# Patient Record
Sex: FEMALE | Race: AMERICAN INDIAN OR ALASKA NATIVE
[De-identification: names, ages, dates, MRNs, and addresses within clinical notes are randomized per-mention and may not be internally consistent; named-entity substitution may affect disease eponyms.]

---

## 2018-04-17 ENCOUNTER — HOSPITAL ENCOUNTER (OUTPATIENT)
Dept: HOSPITAL 5 - SPVIMAG | Age: 63
Discharge: HOME | End: 2018-04-17
Attending: SURGERY
Payer: MEDICARE

## 2018-04-17 DIAGNOSIS — N60.01: Primary | ICD-10-CM

## 2018-04-17 PROCEDURE — C8908 MRI W/O FOL W/CONT, BREAST,: HCPCS

## 2018-04-17 PROCEDURE — 77059: CPT

## 2018-04-17 PROCEDURE — A9577 INJ MULTIHANCE: HCPCS

## 2018-04-19 NOTE — MAGNETIC RESONANCE REPORT
BILATERAL BREAST MRI WITHOUT AND WITH CONTRAST: 04/17/18 13:41:00



CLINICAL: Left breast pain and nipple discharge.



COMPARISON:04/05/18 [facility] left mammogram and 11/13/17 bilateral 

mammogram from [facility].



TECHNIQUE: Axial 1.0-mm T1 without,  axial high resolution 2.0-mm T2 

and axial 1.0-mm dynamic Vibrant high-resolution postcontrast T1 fat 

saturation sequences on a 1.5 Allison magnet.  The examination was 

performed with an 8 channel dedicated Sentinelle breast coil. Post 

processing with CAD and subtraction was performed on an [employer] 

workstation.  19.0 cc of Multihance was injected without incident via a 

left antecubital vein 22-gauge INT for the contrast portion of the 

exam. Consent was obtained prior to the administration of the contrast. 

 



FINDINGS:



Right: Mild background parenchymal enhancement.  No mass or suspicious 

enhancement.  No suspicious right axillary or right internal mammary 

lymph nodes.



Left: Mild background parenchymal enhancement.  Lesion 1 is segmental 

non-Mass enhancement at 12 o'clock 5.8 cm from the nipple measures 2.7 

x 1.4 x 0.9 cm.  It demonstrates heterogeneous enhancement with mixed 

kinetics, 70% peak enhancement and 11% type III washout.  Lesion 2 is 

focal non-Mass enhancement at 12:30 o'clock 2.2 cm from the nipple 

measuring 7.9 x 5.1 x 3.6 mm.  It demonstrates heterogeneous 

enhancement with mixed kinetics, 80% peak enhancement and 32% type III 

washout.  Lesion 3 is focal non-Mass enhancement at 2:30 o'clock 8 cm 

from the nipple measuring 12.0 x 7.7 x 5.8 mm.  It demonstrates 

heterogeneous enhancement with mixed kinetics, 135% peak enhancement 

and 5% type III washout.  No mass and no other suspicious enhancement.  

Several benign cysts of the right breast.  The largest measures 1.2 cm. 

 No suspicious left axillary or left internal mammary lymph nodes.





IMPRESSION: 1.  Suspicious non-Mass enhancement of the left breast at 

three sites.  Recommend MRI guided needle biopsy of at least 2 sites.  

2.  Negative right breast.  3.  No suspicious lymph nodes.



RIGHT BI-RADS  1 -- Negative





LEFT BI-RADS 4 -- Suspicious

## 2018-04-30 ENCOUNTER — HOSPITAL ENCOUNTER (OUTPATIENT)
Dept: HOSPITAL 5 - SPVIMAG | Age: 63
Discharge: HOME | End: 2018-04-30
Attending: SURGERY
Payer: MEDICARE

## 2018-04-30 DIAGNOSIS — D24.2: Primary | ICD-10-CM

## 2018-04-30 PROCEDURE — 77065 DX MAMMO INCL CAD UNI: CPT

## 2018-04-30 PROCEDURE — 19085 BX BREAST 1ST LESION MR IMAG: CPT

## 2018-04-30 PROCEDURE — 19086 BX BREAST ADD LESION MR IMAG: CPT

## 2018-04-30 PROCEDURE — A9577 INJ MULTIHANCE: HCPCS

## 2018-04-30 PROCEDURE — 88305 TISSUE EXAM BY PATHOLOGIST: CPT

## 2018-04-30 NOTE — MAGNETIC RESONANCE REPORT
MRI GUIDED VACUUM ASSISTED CORE BIOPSY TWO SITES LEFT BREAST: 04/30/18



CLINICAL: Left nipple discharge and suspicious non-Mass enhancement on 

recent MRI.



COMPARISON: 04/17/18 MRI



FINDINGS: Consent for the procedure was obtained.  A Vibrant dynamic 

postcontrast series was performed on a 1.5 Allison magnet using an 8 

channel Sentinelle dedicated breast coil.  Two lesions were localized 

and targeted using [employer] Sentinelle biopsy software. 



The skin was anesthetized with 1% lidocaine and small dermatotomies 

were performed.  2% lidocaine was administered for deeper anesthesia.  

9-G biopsy was performed with an IntegenX vacuum assisted device.  

Imaging demonstrated satisfactory positioning of the probe at the two 

sites and satisfactory samples were obtained.  Clips were deployed at 

both sites after confirmation of adequate sampling. The probes were 

removed and hemostasis was achieved with pressure to the sites. Sterile 

dressings were applied.

 

The patient tolerated the procedure well and there were no apparent 

complications. A two view mammogram demonstrated successful deployment 

of both clips.



The patient left the department in good condition and was given 

instructions for wound care and follow-up. 



IMPRESSION: Uncomplicated MRI biopsy with clip placement two sites left 

breast.

## 2018-04-30 NOTE — MAMMOGRAPHY REPORT
LEFT DIGITAL DIAGNOSTIC MAMMOGRAM: 04/30/18 08:13:00



CLINICAL: For clip placement immediately status post MRI guided breast 

biopsy at 2 sites .



COMPARISON:04/05/18



FINDINGS: Biopsy clips are identified at the 2 sites that were biopsied 

today with MRI guidance.The clips are 6 cm apart on the CC view. 





IMPRESSION: Concordant clip placement status post MRI biopsy at 2 sites.



BI-RADS CATEGORY:  4--Suspicious



Pathology pending.

## 2018-05-17 ENCOUNTER — HOSPITAL ENCOUNTER (OUTPATIENT)
Dept: HOSPITAL 5 - OR | Age: 63
Discharge: HOME | End: 2018-05-17
Attending: SURGERY
Payer: MEDICARE

## 2018-05-17 VITALS — DIASTOLIC BLOOD PRESSURE: 59 MMHG | SYSTOLIC BLOOD PRESSURE: 111 MMHG

## 2018-05-17 DIAGNOSIS — I10: ICD-10-CM

## 2018-05-17 DIAGNOSIS — N60.82: ICD-10-CM

## 2018-05-17 DIAGNOSIS — E11.9: ICD-10-CM

## 2018-05-17 DIAGNOSIS — D24.2: Primary | ICD-10-CM

## 2018-05-17 DIAGNOSIS — Z91.013: ICD-10-CM

## 2018-05-17 DIAGNOSIS — F41.9: ICD-10-CM

## 2018-05-17 DIAGNOSIS — Z88.5: ICD-10-CM

## 2018-05-17 DIAGNOSIS — C50.412: ICD-10-CM

## 2018-05-17 DIAGNOSIS — Z80.3: ICD-10-CM

## 2018-05-17 DIAGNOSIS — Z98.890: ICD-10-CM

## 2018-05-17 DIAGNOSIS — Z91.041: ICD-10-CM

## 2018-05-17 DIAGNOSIS — F32.9: ICD-10-CM

## 2018-05-17 PROCEDURE — 82962 GLUCOSE BLOOD TEST: CPT

## 2018-05-17 PROCEDURE — 76098 X-RAY EXAM SURGICAL SPECIMEN: CPT

## 2018-05-17 PROCEDURE — 88307 TISSUE EXAM BY PATHOLOGIST: CPT

## 2018-05-17 PROCEDURE — 19125 EXCISION BREAST LESION: CPT

## 2018-05-17 PROCEDURE — 19282 PERQ DEVICE BREAST EA IMAG: CPT

## 2018-05-17 PROCEDURE — 36415 COLL VENOUS BLD VENIPUNCTURE: CPT

## 2018-05-17 PROCEDURE — 19126 EXCISION ADDL BREAST LESION: CPT

## 2018-05-17 PROCEDURE — 84132 ASSAY OF SERUM POTASSIUM: CPT

## 2018-05-17 PROCEDURE — 19281 PERQ DEVICE BREAST 1ST IMAG: CPT

## 2018-05-17 NOTE — MAMMOGRAPHY REPORT
SPECIMEN RADIOGRAPH LEFT BREAST: 05/17/18 06:49:00





CLINICAL: Surgical excision of known cancer.



FINDINGS: The targeted lesion with a localizer clip and a hookwire are 

identified within the specimen.  The clip corresponds to the more 

posterior cancer in the outer breast.



IMPRESSION: Excision of the targeted lesion.

## 2018-05-17 NOTE — ANESTHESIA CONSULTATION
Anesthesia Consult and Med Hx


Date of service: 05/17/18





- Airway


Anesthetic Teeth Evaluation: Poor


ROM Head & Neck: Adequate


Mental/Hyoid Distance: Adequate


Mallampati Class: Class III


Intubation Access Assessment: Probably Good





- Pulmonary Exam


CTA: Yes





- Cardiac Exam


Cardiac Exam: RRR





- Pre-Operative Health Status


ASA Pre-Surgery Classification: ASA3


Proposed Anesthetic Plan: General, MAC





- Cardiovascular System


Hx Hypertension: Yes (10 YEARS)





- Central Nervous System


Hx Psychiatric Problems: Yes (anxiety/depression)





- Endocrine


Hx Non-Insulin Dependent Diabetes: Yes





- Other Systems


Hx Alcohol Use: No


Hx Substance Use: No


Hx Cancer: No


Hx Obesity: Yes

## 2018-05-17 NOTE — OPERATIVE REPORT
Operative Report


Operative Report: 





Date of Service: May 17, 2018





Preoperative diagnosis: Left central breast intraductal papilloma and left 

upper outer quadrant intraductal papilloma





Postoperative diagnosis: Same





Procedure: Left needle localization excisional biopsy of two intraductal 

papillomas





Surgeon: Isi Acevedo MD





Anesthesia: General





Findings: Left wire and clip present within radiograph specimens of both lesions





Complications: None





EBL: Minimal





Disposition: PACU in good condition





Procedure in detail: This is a 62-year-old lady with recent left  breast MRI 

guided biopsy with findings of two intraductal papillomas. Recommendations were 

to proceed with an excisional biopsy of both areas to rule out malignancy. 

Patient wished to proceed with the above procedure. 





Procedure in detail: The patient was taken to radiology for wire placement for 

localization of area of concern of both areas. Patient was then taken to the 

operating room. Gen. anesthesia was administered. Left breast was prepped and 

draped in the normal sterile operative fashion. Both wires were identified. 

Timeout was performed. An upper outer quadrant breast incision was made with a 

15 blade knife and dissection taken down to subcutaneous tissues. First began 

with central breast excisional biopsy of papilloma with raising of the medial 

flap with removal of the wire from the skin, followed by raising of the lateral 

flap, superior flap and inferior flap. The breast area of concern was 

appropriately removed posteriorly with the aid of the bovie cautery. The wire 

was not encountered. Specimen was marked and then sent to pathology and 

radiology; radiograph specimen with wire and clip present. Breast cavity was 

irrigated and hemostasis was obtained. 





Attention was then taken towards the left upper outer quadrant papilloma.  

Through the same incision, first began raising of the lateral flap with removal 

of the wire from the skin, followed by raising of the medial flap, superior 

flap and inferior flap. The breast area of concern was appropriately removed 

posteriorly  with the aid of the bovie cautery. The wire was not encountered. 

Specimen was marked and then sent to pathology and radiology; radiograph 

specimen with wire and clip present. Breast cavity was irrigated and hemostasis 

was obtained. The breast cavity was anesthetized with 1% lidocaine mixed with 

quarter percent Marcaine. The subcutaneous tissues were approximated and closed 

using interrupted 3-0 Vicryl followed by a running 4-0 Monocryl and skin affix. 

She tolerated surgery very well and was awaken from anesthesia without any 

complications and transported to PACU in good condition.

## 2018-05-17 NOTE — MAMMOGRAPHY REPORT
NEEDLE LOCALIZATION AND HOOKWIRE PLACEMENT x2 LEFT BREAST:05/17/18 



CLINICAL: Known left breast cancer at 2 sites.



COMPARISON: 04/30/18



FINDINGS: Using mammographic guidance, 1% lidocaine local anesthesia 

and sterile technique, a 7.5-cm Downey hookwire was placed from a 

lateral approach to localize the more anterior lateral clip which 

corresponds to an MRI biopsy site.  A 5.0-cm  Downey hookwire was then 

placed from a lateral approach to localize the more posterior lateral 

clip which corresponds to the initial biopsy site.  Hook wires were 

deployed successfully and an orthogonal image demonstrated satisfactory 

positioning of the wires.  The patient tolerated the procedure well and 

there were no apparent complications.



IMPRESSION: Uncomplicated hookwire placement at 2 sites left breast.

## 2018-05-17 NOTE — SHORT STAY SUMMARY
Short Stay Documentation


Date of service: 05/17/18





- History


H&P: obtained from office





- Allergies and Medications


Current Medications: 


 Allergies





iodine Allergy (Verified 05/14/18 12:31)


 Itching


codeine Adverse Reaction (Verified 05/17/18 09:19)


 Itching


shellfish derived Adverse Reaction (Verified 05/17/18 09:19)


 Itching





 Home Medications











 Medication  Instructions  Recorded  Confirmed  Last Taken  Type


 


ALPRAZolam [Xanax TAB] 1 mg PO DAILY 05/14/18 05/14/18 05/17/18 06:20 History


 


Duloxetine HCl [DULoxetine] 60 mg PO DAILY 05/14/18 05/14/18 05/16/18 History


 


Losartan/Hydrochlorothiazide 1 tab PO DAILY 05/14/18 05/14/18 05/17/18 06:20 

History





[Losartan-Hctz 100-25 mg Tab]     


 


Metformin HCl [Glucophage] 1,000 mg PO BID 05/14/18 05/14/18 05/16/18 History


 


Simvastatin [Zocor TAB] 10 mg PO DAILY 05/14/18 05/14/18 05/16/18 History


 


amLODIPine [Norvasc] 5 mg PO DAILY 05/14/18 05/14/18 05/17/18 06:20 History


 


glipiZIDE XL [Glucotrol Xl] 5 mg PO DAILY 05/14/18 05/14/18 05/16/18 History


 


Ibuprofen 800 mg PO Q8HR PRN #30 tablet 05/17/18  Unknown Rx








Active Medications





Lactated Ringer's (Lactated Ringers)  1,000 mls @ 100 mls/hr IV AS DIRECT PANDA


   Last Admin: 05/17/18 09:14 Dose:  100 mls/hr


Midazolam HCl (Versed)  2 mg IV PREOP NR


   Stop: 05/17/18 23:59











- Brief post op/procedure progress note


Date of procedure: 05/17/18


Pre-op diagnosis: Left breast cancer of the upper outer quadrant


Post-op diagnosis: same


Procedure: 





Left needle localization excisional biopsy of the upper outer quadrant and 

central breast of two intraductal papillomas


Anesthesia: GETA


Findings: 





Radiograph specimen of both specimens with clip and wire present


Surgeon: ELIO GREER


Estimated blood loss: minimal


Pathology: list (left breast excisional biopsyx2)


Specimen disposition: to lab


Condition: stable





- Disposition


Condition at discharge: Good


Disposition: DC-01 TO HOME OR SELFCARE





Short Stay Discharge Plan


Activity: other (no heavy lifting)


Diet: regular


Wound: other (keep incision clean and dry, may shower in 24 hours; no baths, 

pools or lakes; do not rub or scrub incision; wear breast binder or sports bra)


Follow up with: 


ANTONIETA KOO MD [Primary Care Provider] - 7 Days


ELIO GREER MD [Staff Physician] - 7 Days


Prescriptions: 


Ibuprofen 800 mg PO Q8HR PRN #30 tablet


 PRN Reason: Pain

## 2018-05-17 NOTE — MAMMOGRAPHY REPORT
SPECIMEN RADIOGRAPH LEFT BREAST: 05/17/18 06:49:00





CLINICAL: Surgical excision of a known cancer..



FINDINGS: The targeted lesion with a localizer clip and a hookwire are 

identified within the specimen.  The clip correlates with the recent 

MRI a biopsy site.



IMPRESSION: Excision of the targeted known cancer.